# Patient Record
Sex: MALE | Race: BLACK OR AFRICAN AMERICAN | Employment: FULL TIME | URBAN - METROPOLITAN AREA
[De-identification: names, ages, dates, MRNs, and addresses within clinical notes are randomized per-mention and may not be internally consistent; named-entity substitution may affect disease eponyms.]

---

## 2018-09-13 ENCOUNTER — OFFICE VISIT (OUTPATIENT)
Dept: FAMILY MEDICINE CLINIC | Age: 38
End: 2018-09-13

## 2018-09-13 VITALS
WEIGHT: 287 LBS | HEIGHT: 71 IN | DIASTOLIC BLOOD PRESSURE: 85 MMHG | OXYGEN SATURATION: 99 % | TEMPERATURE: 98.2 F | RESPIRATION RATE: 16 BRPM | HEART RATE: 80 BPM | SYSTOLIC BLOOD PRESSURE: 137 MMHG | BODY MASS INDEX: 40.18 KG/M2

## 2018-09-13 DIAGNOSIS — Z00.00 PHYSICAL EXAM: Primary | ICD-10-CM

## 2018-09-13 DIAGNOSIS — Z28.21 REFUSED INFLUENZA VACCINE: ICD-10-CM

## 2018-09-13 DIAGNOSIS — Z11.4 ENCOUNTER FOR SCREENING FOR HIV: ICD-10-CM

## 2018-09-13 DIAGNOSIS — Z11.59 NEED FOR HEPATITIS C SCREENING TEST: ICD-10-CM

## 2018-09-13 DIAGNOSIS — Z76.89 ENCOUNTER TO ESTABLISH CARE: ICD-10-CM

## 2018-09-13 NOTE — MR AVS SNAPSHOT
[address] 
[phone] Patient: Esperanza Hayden MRN: [id number] [facility]:[birth year] Visit Information Date & Time Provider Department Dept. Phone Encounter #  
 9/13/2018  1:00 PM Verito Olvera MD [address] [phone] [id number] Follow-up Instructions Return if symptoms worsen or fail to improve. Upcoming Health Maintenance Date Due Influenza Age 5 to Adult 10/3/2018* DTaP/Tdap/Td series (1 - Tdap) 10/9/2028* *Topic was postponed. The date shown is not the original due date. Allergies as of 9/13/2018  Review Complete On: 9/13/2018 By: Jayla Meredith LPN No Known Allergies Current Immunizations  Never Reviewed No immunizations on file. Not reviewed this visit You Were Diagnosed With   
  
 Codes Comments Physical exam    -  Primary ICD-10-CM: Z00.00 ICD-9-CM: V70.9 Refused influenza vaccine     ICD-10-CM: Z28.21 ICD-9-CM: V64.06 Encounter to establish care     ICD-10-CM: Z76.89 
ICD-9-CM: V65.8 Need for hepatitis C screening test     ICD-10-CM: Z11.59 
ICD-9-CM: V73.89 Encounter for screening for HIV     ICD-10-CM: Z11.4 ICD-9-CM: V73.89 Vitals BP Pulse Temp Resp Height(growth percentile) Weight(growth percentile) 137/85 (BP 1 Location: Right arm, BP Patient Position: Sitting) 80 98.2 °F (36.8 °C) (Oral) 16 5' 11\" (1.803 m) 287 lb (130.2 kg) SpO2 BMI Smoking Status 99% 40.03 kg/m2 Never Smoker Vitals History BMI and BSA Data Body Mass Index Body Surface Area 40.03 kg/m 2 2.55 m 2 Preferred Pharmacy Pharmacy Name Phone [id number], [address] [phone] Your Updated Medication List  
  
Notice  As of 9/13/2018  1:20 PM  
 You have not been prescribed any medications. We Performed the Following CBC W/O DIFF [85796 CPT(R)] HEMOGLOBIN A1C WITH EAG [79230 CPT(R)] HEPATITIS C AB [93418 CPT(R)] HIV 1/2 AG/AB, 4TH GENERATION,W RFLX CONFIRM C9089137 CPT(R)] LIPID PANEL [05561 CPT(R)] METABOLIC PANEL, COMPREHENSIVE [32336 CPT(R)] Follow-up Instructions Return if symptoms worsen or fail to improve. Patient Instructions Well Visit, Ages 25 to 48: Care Instructions Your Care Instructions Physical exams can help you stay healthy. Your doctor has checked your overall health and may have suggested ways to take good care of yourself. He or she also may have recommended tests. At home, you can help prevent illness with healthy eating, regular exercise, and other steps. Follow-up care is a key part of your treatment and safety. Be sure to make and go to all appointments, and call your doctor if you are having problems. It's also a good idea to know your test results and keep a list of the medicines you take. How can you care for yourself at home? · Reach and stay at a healthy weight. This will lower your risk for many problems, such as obesity, diabetes, heart disease, and high blood pressure. · Get at least 30 minutes of physical activity on most days of the week. Walking is a good choice. You also may want to do other activities, such as running, swimming, cycling, or playing tennis or team sports. Discuss any changes in your exercise program with your doctor. · Do not smoke or allow others to smoke around you. If you need help quitting, talk to your doctor about stop-smoking programs and medicines. These can increase your chances of quitting for good. · Talk to your doctor about whether you have any risk factors for sexually transmitted infections (STIs). Having one sex partner (who does not have STIs and does not have sex with anyone else) is a good way to avoid these infections. · Use birth control if you do not want to have children at this time.  Talk with your doctor about the choices available and what might be best for you. · Protect your skin from too much sun. When you're outdoors from 10 a.m. to 4 p.m., stay in the shade or cover up with clothing and a hat with a wide brim. Wear sunglasses that block UV rays. Even when it's cloudy, put broad-spectrum sunscreen (SPF 30 or higher) on any exposed skin. · See a dentist one or two times a year for checkups and to have your teeth cleaned. · Wear a seat belt in the car. · Drink alcohol in moderation, if at all. That means no more than 2 drinks a day for men and 1 drink a day for women. Follow your doctor's advice about when to have certain tests. These tests can spot problems early. For everyone · Cholesterol. Have the fat (cholesterol) in your blood tested after age 21. Your doctor will tell you how often to have this done based on your age, family history, or other things that can increase your risk for heart disease. · Blood pressure. Have your blood pressure checked during a routine doctor visit. Your doctor will tell you how often to check your blood pressure based on your age, your blood pressure results, and other factors. · Vision. Talk with your doctor about how often to have a glaucoma test. 
· Diabetes. Ask your doctor whether you should have tests for diabetes. · Colon cancer. Have a test for colon cancer at age 48. You may have one of several tests. If you are younger than 48, you may need a test earlier if you have any risk factors. Risk factors include whether you already had a precancerous polyp removed from your colon or whether your parent, brother, sister, or child has had colon cancer. For women · Breast exam and mammogram. Talk to your doctor about when you should have a clinical breast exam and a mammogram. Medical experts differ on whether and how often women under 50 should have these tests. Your doctor can help you decide what is right for you. · Pap test and pelvic exam. Begin Pap tests at age 24. A Pap test is the best way to find cervical cancer. The test often is part of a pelvic exam. Ask how often to have this test. 
· Tests for sexually transmitted infections (STIs). Ask whether you should have tests for STIs. You may be at risk if you have sex with more than one person, especially if your partners do not wear condoms. For men · Tests for sexually transmitted infections (STIs). Ask whether you should have tests for STIs. You may be at risk if you have sex with more than one person, especially if you do not wear a condom. · Testicular cancer exam. Ask your doctor whether you should check your testicles regularly. · Prostate exam. Talk to your doctor about whether you should have a blood test (called a PSA test) for prostate cancer. Experts differ on whether and when men should have this test. Some experts suggest it if you are older than 39 and are -American or have a father or brother who got prostate cancer when he was younger than 72. When should you call for help? Watch closely for changes in your health, and be sure to contact your doctor if you have any problems or symptoms that concern you. Where can you learn more? Go to http://ashly-pamella.info/. Enter P072 in the search box to learn more about \"Well Visit, Ages 25 to 48: Care Instructions. \" Current as of: May 16, 2017 Content Version: 11.7 © 9796-7642 [employer]. Care instructions adapted under license by [employer] (which disclaims liability or warranty for this information). If you have questions about a medical condition or this instruction, always ask your healthcare professional. [place] [address] any warranty or liability for your use of this information. Introducing [facility]!    
 [employer] introduces [employer] patient portal. Now you can access parts of your medical record, email your doctor's office, and request medication refills online. 1. In your internet browser, go to [email or web address] 2. Click on the First Time User? Click Here link in the Sign In box. You will see the New Member Sign Up page. 3. Enter your [employer] Access Code exactly as it appears below. You will not need to use this code after youve completed the sign-up process. If you do not sign up before the expiration date, you must request a new code. · [employer] Access Code: [email or web address] Expires: 12/12/2018  1:20 PM 
 
4. Enter the last four digits of your Social Security Number (xxxx) and Date of Birth (mm/dd/yyyy) as indicated and click Submit. You will be taken to the next sign-up page. 5. Create a [employer] ID. This will be your [employer] login ID and cannot be changed, so think of one that is secure and easy to remember. 6. Create a [employer] password. You can change your password at any time. 7. Enter your Password Reset Question and Answer. This can be used at a later time if you forget your password. 8. Enter your e-mail address. You will receive e-mail notification when new information is available in [address] 9. Click Sign Up. You can now view and download portions of your medical record. 10. Click the Download Summary menu link to download a portable copy of your medical information. If you have questions, please visit the Frequently Asked Questions section of the [employer] website. Remember, [employer] is NOT to be used for urgent needs. For medical emergencies, dial 911. Now available from your iPhone and Android! Please provide this summary of care documentation to your next provider. Your primary care clinician is listed as Jenny Avila. If you have any questions after today's visit, please call [phone].

## 2018-09-13 NOTE — PATIENT INSTRUCTIONS

## 2018-09-13 NOTE — PROGRESS NOTES
Identified Patient with two Patient identifiers (Name and ). Two Patient Identifiers confirmed. Reviewed record in preparation for visit and have obtained necessary documentation. Chief Complaint Patient presents with [facility] Tdap -2018  Decline Influenza Vaccine at this time Visit Vitals  /85 (BP 1 Location: Right arm, BP Patient Position: Sitting)  Pulse 80  Temp 98.2 °F (36.8 °C) (Oral)  Resp 16  
 Ht 5' 11\" (1.803 m)  Wt 287 lb (130.2 kg)  SpO2 99%  BMI 40.03 kg/m2 1. Have you been to the ER, urgent care clinic since your last visit? Hospitalized since your last visit? No 
 
2. Have you seen or consulted any other health care providers outside of the [address] [place] [address] since your last visit? Include any pap smears or colon screening.  No

## 2018-09-13 NOTE — PROGRESS NOTES
Subjective:  
Yana Shay is an 41 y.o. male who presents for complete physical exam. 
 
The patient is new to me and to [facility] practice. Doing well. No complaints. Recently had Keloid removed from his nose by dermatologist. 
 
Diet: He recently started to work on his diet to loose weight. Eating more salads and veggies, lean chicken, sometimes fast food. Exercise: Goes to GYM 3-4 times a week Allergies - reviewed:  
No Known Allergies Medications - reviewed: No current outpatient prescriptions on file. No current facility-administered medications for this visit. Past Medical History - reviewed: 
History reviewed. No pertinent past medical history. Past Surgical History - reviewed: 
Past Surgical History:  
Procedure Laterality Date  HX ORTHOPAEDIC Left Ankle-2 PIns/2Screw Placement Family History - reviewed: 
Family History Problem Relation Age of Onset  Diabetes Mother Social History - reviewed: 
Social History Social History  Marital status: UNKNOWN Spouse name: N/A  
 Number of children: N/A  
 Years of education: N/A Occupational History  Not on file. Social History Main Topics  Smoking status: Never Smoker  Smokeless tobacco: Never Used  Alcohol use No  
 Drug use: No  
 Sexual activity: Yes  
  Partners: Female Birth control/ protection: None, Surgical  
 
Other Topics Concern  Not on file Social History Narrative  No narrative on file Immunizations - reviewed: There is no immunization history on file for this patient. Flu: Declined today Tdap: Received in July, 2018 at work Health Maintenance reviewed - 
Colonoscopy Not indicated HIV testing Done a long time ago, requested today Hepatitis C testing Never done, requested today Lung cancer screening : Not indicated Review of Systems CONSTITUTIONAL: denies fever. Denies chills. EYES: denies double vision. Has blurry vision relieved with contacts. ENT: denies sinus congestion. Denies sinus drainage CARDIOVASCULAR: denies chest pain. Denies palpitations RESPIRATORY: denies shortness of breath GI: denies abdominal pain. Denies change in stools. Denies hematochezia/melana : denies dysuria, denies urgency NEURO: denies headaches, denies dizziness MUSCULOSKELETAL: denies joint pain. SKIN: denies rash. Denies easy bruising PSYCH: denies anxiety. Denies depression Objective:  
 
Visit Vitals  /85 (BP 1 Location: Right arm, BP Patient Position: Sitting)  Pulse 80  Temp 98.2 °F (36.8 °C) (Oral)  Resp 16  
 Ht 5' 11\" (1.803 m)  Wt 287 lb (130.2 kg)  SpO2 99%  BMI 40.03 kg/m2 General appearance - alert, well appearing, and in no distress Eyes - pupils equal and reactive, extraocular eye movements intact Ears - bilateral TM's and external ear canals normal 
Nose - normal and patent, no erythema, discharge or polyps, small patch on the nose. Mouth - mucous membranes moist, pharynx normal without lesions Neck - supple, no significant adenopathy Chest - clear to auscultation, no wheezes, rales or rhonchi, symmetric air entry Heart - normal rate, regular rhythm, normal S1, S2, no murmurs, rubs, clicks or gallops Abdomen - soft, nontender, nondistended, no masses or organomegaly Neurological - alert, oriented, normal speech, no focal findings or movement disorder noted Musculoskeletal - no joint tenderness, deformity or swelling Extremities - peripheral pulses normal, no pedal edema, no clubbing or cyanosis Skin - normal coloration and turgor, no rashes, no suspicious skin lesions noted Assessment:  
40 yo male w/o significant medical history who is here for: ICD-10-CM ICD-9-CM 1.  Physical exam Z00.00 V70.9 CBC W/O DIFF  
   METABOLIC PANEL, COMPREHENSIVE  
   LIPID PANEL  
   HEMOGLOBIN A1C WITH EAG  
 2. Refused influenza vaccine Z28.21 V64.06   
3. Encounter to establish care Z76.89 V65.8 4. Need for hepatitis C screening test Z11.59 V73.89 HEPATITIS C AB  
5. Encounter for screening for HIV Z11.4 V73.89 HIV 1/2 AG/AB, 4TH GENERATION,W RFLX CONFIRM Plan:  
· Counseled re: diet, exercise, healthy lifestyle · Appropriate labs, vaccines, imaging studies, and referrals ordered as listed above Discussed the patient's BMI with him. The BMI follow up plan is as follows: recommended continue regular exercise and diet. · The patient was counseled on the dangers of tobacco use, and was advised to quit. Reviewed strategies to maximize success, including written materials. Follow-up Disposition: 
Return if symptoms worsen or fail to improve. I have discussed the diagnosis with the patient and the intended plan as seen in the above orders. The patient has received an after-visit summary and questions were answered concerning future plans. I have discussed medication side effects and warnings with the patient as well. Informed pt to return to the office if new symptoms arise.  
 
 
Linh Jackson MD 
Family Medicine Resident, PGY-3

## 2018-09-14 LAB
ALBUMIN SERPL-MCNC: 4.3 G/DL (ref 3.5–5.5)
ALBUMIN/GLOB SERPL: 1.5 {RATIO} (ref 1.2–2.2)
ALP SERPL-CCNC: 48 IU/L (ref 39–117)
ALT SERPL-CCNC: 31 IU/L (ref 0–44)
AST SERPL-CCNC: 45 IU/L (ref 0–40)
BILIRUB SERPL-MCNC: 0.3 MG/DL (ref 0–1.2)
BUN SERPL-MCNC: 12 MG/DL (ref 6–20)
BUN/CREAT SERPL: 10 (ref 9–20)
CALCIUM SERPL-MCNC: 9.7 MG/DL (ref 8.7–10.2)
CHLORIDE SERPL-SCNC: 101 MMOL/L (ref 96–106)
CHOLEST SERPL-MCNC: 176 MG/DL (ref 100–199)
CO2 SERPL-SCNC: 22 MMOL/L (ref 20–29)
CREAT SERPL-MCNC: 1.18 MG/DL (ref 0.76–1.27)
ERYTHROCYTE [DISTWIDTH] IN BLOOD BY AUTOMATED COUNT: 15.6 % (ref 12.3–15.4)
EST. AVERAGE GLUCOSE BLD GHB EST-MCNC: 148 MG/DL
GLOBULIN SER CALC-MCNC: 2.9 G/DL (ref 1.5–4.5)
GLUCOSE SERPL-MCNC: 115 MG/DL (ref 65–99)
HBA1C MFR BLD: 6.8 % (ref 4.8–5.6)
HCT VFR BLD AUTO: 43.8 % (ref 37.5–51)
HCV AB S/CO SERPL IA: <0.1 S/CO RATIO (ref 0–0.9)
HDLC SERPL-MCNC: 30 MG/DL
HGB BLD-MCNC: 14.1 G/DL (ref 13–17.7)
HIV 1+2 AB+HIV1 P24 AG SERPL QL IA: NON REACTIVE
INTERPRETATION, 910389: NORMAL
LDLC SERPL CALC-MCNC: 92 MG/DL (ref 0–99)
Lab: NORMAL
MCH RBC QN AUTO: 23.7 PG (ref 26.6–33)
MCHC RBC AUTO-ENTMCNC: 32.2 G/DL (ref 31.5–35.7)
MCV RBC AUTO: 74 FL (ref 79–97)
PLATELET # BLD AUTO: 295 X10E3/UL (ref 150–379)
POTASSIUM SERPL-SCNC: 4.4 MMOL/L (ref 3.5–5.2)
PROT SERPL-MCNC: 7.2 G/DL (ref 6–8.5)
RBC # BLD AUTO: 5.95 X10E6/UL (ref 4.14–5.8)
SODIUM SERPL-SCNC: 140 MMOL/L (ref 134–144)
TRIGL SERPL-MCNC: 271 MG/DL (ref 0–149)
VLDLC SERPL CALC-MCNC: 54 MG/DL (ref 5–40)
WBC # BLD AUTO: 7.2 X10E3/UL (ref 3.4–10.8)

## 2018-09-14 NOTE — PROGRESS NOTES
CBC WNL, Hg is 14.1, CMP with slight elevation of AST to 45, lipid panel with , , HDL 30 ,LDL 92. Hg A1C is 6.8 -c/w diabetes. HIV is NR, Hep C is negative. Will call and discuss with the patient.

## 2018-09-17 ENCOUNTER — TELEPHONE (OUTPATIENT)
Dept: FAMILY MEDICINE CLINIC | Age: 38
End: 2018-09-17

## 2018-09-17 DIAGNOSIS — R94.5 ABNORMAL RESULTS OF LIVER FUNCTION STUDIES: Primary | ICD-10-CM

## 2018-09-17 NOTE — TELEPHONE ENCOUNTER
I called the patient at [phone] to discuss the test results. The patient was identified by 2 identifiers. Discussed results. Discussed recently diagnosed DM with HgA1C of 6.8, discussed treatment options. The patient would like to work on diet and exercise and recheck the labs in 3 months, would like to hold on medications. Will recheck CMP , lipid, urine microalbumin and HgA1C in 3 months. TH ept will scheduled lab appointment.      1:37 PM  9/17/2018  Estella Brown MD

## 2018-09-30 ENCOUNTER — ED HISTORICAL/CONVERTED ENCOUNTER (OUTPATIENT)
Dept: OTHER | Age: 38
End: 2018-09-30

## 2019-04-12 ENCOUNTER — OFFICE VISIT (OUTPATIENT)
Dept: FAMILY MEDICINE CLINIC | Age: 39
End: 2019-04-12

## 2019-04-12 VITALS
DIASTOLIC BLOOD PRESSURE: 78 MMHG | HEART RATE: 88 BPM | WEIGHT: 290.6 LBS | HEIGHT: 71 IN | BODY MASS INDEX: 40.68 KG/M2 | RESPIRATION RATE: 18 BRPM | OXYGEN SATURATION: 98 % | TEMPERATURE: 98.1 F | SYSTOLIC BLOOD PRESSURE: 150 MMHG

## 2019-04-12 DIAGNOSIS — Z11.3 SCREENING EXAMINATION FOR STD (SEXUALLY TRANSMITTED DISEASE): ICD-10-CM

## 2019-04-12 DIAGNOSIS — R03.0 ELEVATED BLOOD PRESSURE READING: Primary | ICD-10-CM

## 2019-04-12 NOTE — LETTER
NOTIFICATION RETURN TO WORK / SCHOOL 
 
4/12/2019 4:09 PM 
 
Mr. Richy Mclean [address] [id number] To Whom It May Concern: 
 
Richy Mclean is currently under the care of [facility]. He was seen in the office today, Friday, April 12, 2019 If there are questions or concerns please have the patient contact our office.  
 
 
 
Sincerely, 
 
 
Skyler Ann MD

## 2019-04-12 NOTE — PROGRESS NOTES
Vega Vega is a 46 y.o. male who presents for   Elevated BP: 156/100 at daughter's PCP this morning. Recently switched to night shift and not sleeping as well. No prior h/o HTN. No family h/o HTN. Requesting STD screen. Had negative HIV 9/2018. Would like syphilis, gonorrhea, and chlamydia screening. He is asymptomatic. PMHx:  History reviewed. No pertinent past medical history. Meds: none      Allergies:   No Known Allergies    Smoker:  Social History     Tobacco Use   Smoking Status Never Smoker   Smokeless Tobacco Never Used       ETOH:   Social History     Substance and Sexual Activity   Alcohol Use No       FH:   Family History   Problem Relation Age of Onset    Diabetes Mother        ROS:  General/Constitutional:   No headache, fever, fatigue, weight loss or weight gain       Eyes:   No redness, pruritis, pain, visual changes, swelling, or discharge      Ears:    No pain, loss or changes in hearing     Neck:   No swelling, masses, stiffness, pain, or limited movement     Cardiac:    No chest pain      Respiratory:   No cough or shortness of breath     GI:   No nausea/vomiting, diarrhea, abdominal pain, bloody or dark stools       :   No dysuria or  hematuria    Neurological:   No loss of consciousness, dizziness, seizures, dysarthria, cognitive changes, memory changes,  problems with balance, or unilateral weakness     Skin: No rash     Physical Exam:  Visit Vitals  /78 (BP 1 Location: Left arm, BP Patient Position: Sitting)   Pulse 88   Temp 98.1 °F (36.7 °C) (Oral)   Resp 18   Ht 5' 11\" (1.803 m)   Wt 290 lb 9.6 oz (131.8 kg)   SpO2 98%   BMI 40.53 kg/m²     GEN: No apparent distress. Alert and oriented and responds to all questions appropriately.   EYES:  Conjunctiva clear;   LUNGS: Respirations unlabored; clear to auscultation bilaterally  CARDIOVASCULAR: Regular, rate, and rhythm without murmurs, gallops or rubs   ABDOMEN: Soft; nontender; nondistended; normoactive bowel sounds; no masses or organomegaly  NEUROLOGIC:  No focal neurologic deficits. EXT: Well perfused. No edema. SKIN: No obvious rashes. Assessment:    ICD-10-CM ICD-9-CM    1. Elevated blood pressure reading R03.0 796.2 CBC W/O DIFF      HEMOGLOBIN A1C WITH EAG      TSH 3RD GENERATION      LIPID PANEL   2. Screening examination for STD (sexually transmitted disease) Z11.3 V74.5 RPR      CHLAMYDIA/GC PCR       Plan:  Elevated BP: Labs today. He will keep a home BP log. RTC in 2 weeks. Discussed diet, exercise, and weight loss. STD screening: labs as above. RTC: 2 weeks.

## 2019-04-12 NOTE — PROGRESS NOTES
Identified Patient with two Patient identifiers (Name and ). Two Patient Identifiers confirmed. Reviewed record in preparation for visit and have obtained necessary documentation. Chief Complaint   Patient presents with    Blood Pressure Check     Patient states BP Check at Daughter's Physician's Office of 156/107 this morning due to headache. No personal or family history Hypertension        Visit Vitals  /78 (BP 1 Location: Left arm, BP Patient Position: Sitting)   Pulse 88   Temp 98.1 °F (36.7 °C) (Oral)   Resp 18   Ht 5' 11\" (1.803 m)   Wt 290 lb 9.6 oz (131.8 kg)   SpO2 98%   BMI 40.53 kg/m²       1. Have you been to the ER, urgent care clinic since your last visit? Hospitalized since your last visit? No    2. Have you seen or consulted any other health care providers outside of the [address] [place] [address] since your last visit? Include any pap smears or colon screening.  No

## 2019-04-13 LAB
CHOLEST SERPL-MCNC: 174 MG/DL (ref 100–199)
ERYTHROCYTE [DISTWIDTH] IN BLOOD BY AUTOMATED COUNT: 15.6 % (ref 12.3–15.4)
EST. AVERAGE GLUCOSE BLD GHB EST-MCNC: 143 MG/DL
HBA1C MFR BLD: 6.6 % (ref 4.8–5.6)
HCT VFR BLD AUTO: 46.2 % (ref 37.5–51)
HDLC SERPL-MCNC: 27 MG/DL
HGB BLD-MCNC: 14.4 G/DL (ref 13–17.7)
INTERPRETATION, 910389: NORMAL
LDLC SERPL CALC-MCNC: 76 MG/DL (ref 0–99)
Lab: NORMAL
MCH RBC QN AUTO: 23.8 PG (ref 26.6–33)
MCHC RBC AUTO-ENTMCNC: 31.2 G/DL (ref 31.5–35.7)
MCV RBC AUTO: 76 FL (ref 79–97)
PLATELET # BLD AUTO: 251 X10E3/UL (ref 150–379)
RBC # BLD AUTO: 6.05 X10E6/UL (ref 4.14–5.8)
RPR SER QL: NON REACTIVE
TRIGL SERPL-MCNC: 354 MG/DL (ref 0–149)
TSH SERPL DL<=0.005 MIU/L-ACNC: 2.78 UIU/ML (ref 0.45–4.5)
VLDLC SERPL CALC-MCNC: 71 MG/DL (ref 5–40)
WBC # BLD AUTO: 6.2 X10E3/UL (ref 3.4–10.8)

## 2019-04-16 LAB
C TRACH RRNA SPEC QL NAA+PROBE: NEGATIVE
N GONORRHOEA RRNA SPEC QL NAA+PROBE: NEGATIVE

## 2019-04-30 ENCOUNTER — TELEPHONE (OUTPATIENT)
Dept: FAMILY MEDICINE CLINIC | Age: 39
End: 2019-04-30

## 2019-04-30 NOTE — TELEPHONE ENCOUNTER
Call attempted phon e temporarily out of service      ----- Message from Marianne Mejias MD sent at 4/29/2019  1:29 PM EDT -----  Please make an appointment for pt to review labs. Thanks.

## 2019-05-01 NOTE — TELEPHONE ENCOUNTER
Spoke with pt wife at   [phone] and she will have him to call, please schedule appt with Dr. Mali Del Toro

## 2019-05-16 ENCOUNTER — ED HISTORICAL/CONVERTED ENCOUNTER (OUTPATIENT)
Dept: OTHER | Age: 39
End: 2019-05-16

## 2021-06-23 ENCOUNTER — TELEPHONE (OUTPATIENT)
Dept: FAMILY MEDICINE CLINIC | Age: 41
End: 2021-06-23

## 2021-06-23 NOTE — TELEPHONE ENCOUNTER
I called patient this morning and it looks like he wanted to schedule his complete physical. I scheduled him for next week and he is aware about date and time.

## 2021-06-23 NOTE — TELEPHONE ENCOUNTER
----- Message from Tutu Rich sent at 6/22/2021  3:22 PM EDT -----  Regarding: Dr. Maureen Viveros: [phone]  Appointment not available    Caller's first and last name and relationship to patient (if not the patient): N/A      Best contact number:[phone]      Preferred date and time: First available, anytime      Scheduled appointment date and time: N/A      Reason for appointment: Routine Care. Details to clarify the request: Patient would like to come into the office.         Tutu Rich

## 2024-12-28 ENCOUNTER — HOSPITAL ENCOUNTER (EMERGENCY)
Facility: HOSPITAL | Age: 44
Discharge: HOME | End: 2024-12-28
Attending: STUDENT IN AN ORGANIZED HEALTH CARE EDUCATION/TRAINING PROGRAM

## 2024-12-28 VITALS
BODY MASS INDEX: 34.58 KG/M2 | HEART RATE: 106 BPM | TEMPERATURE: 102.1 F | DIASTOLIC BLOOD PRESSURE: 68 MMHG | SYSTOLIC BLOOD PRESSURE: 133 MMHG | HEIGHT: 71 IN | WEIGHT: 247 LBS | RESPIRATION RATE: 20 BRPM | OXYGEN SATURATION: 96 %

## 2024-12-28 DIAGNOSIS — J10.1 INFLUENZA A: Primary | ICD-10-CM

## 2024-12-28 LAB
FLUAV RNA SPEC QL NAA+PROBE: DETECTED
FLUBV RNA SPEC QL NAA+PROBE: NOT DETECTED
SARS-COV-2 RNA RESP QL NAA+PROBE: NOT DETECTED

## 2024-12-28 PROCEDURE — 87636 SARSCOV2 & INF A&B AMP PRB: CPT

## 2024-12-28 PROCEDURE — 99283 EMERGENCY DEPT VISIT LOW MDM: CPT

## 2024-12-28 PROCEDURE — 6370000000 HC RX 637 (ALT 250 FOR IP): Performed by: STUDENT IN AN ORGANIZED HEALTH CARE EDUCATION/TRAINING PROGRAM

## 2024-12-28 RX ORDER — OSELTAMIVIR PHOSPHATE 75 MG/1
75 CAPSULE ORAL 2 TIMES DAILY
Qty: 10 CAPSULE | Refills: 0 | Status: SHIPPED | OUTPATIENT
Start: 2024-12-28 | End: 2025-01-02

## 2024-12-28 RX ORDER — IBUPROFEN 600 MG/1
600 TABLET, FILM COATED ORAL
Status: COMPLETED | OUTPATIENT
Start: 2024-12-28 | End: 2024-12-28

## 2024-12-28 RX ADMIN — IBUPROFEN 600 MG: 600 TABLET, FILM COATED ORAL at 05:59

## 2024-12-28 ASSESSMENT — PAIN SCALES - GENERAL
PAINLEVEL_OUTOF10: 7
PAINLEVEL_OUTOF10: 7

## 2024-12-28 ASSESSMENT — PAIN - FUNCTIONAL ASSESSMENT: PAIN_FUNCTIONAL_ASSESSMENT: 0-10

## 2024-12-28 ASSESSMENT — LIFESTYLE VARIABLES
HOW OFTEN DO YOU HAVE A DRINK CONTAINING ALCOHOL: MONTHLY OR LESS
HOW MANY STANDARD DRINKS CONTAINING ALCOHOL DO YOU HAVE ON A TYPICAL DAY: 1 OR 2

## 2024-12-28 ASSESSMENT — PAIN DESCRIPTION - PAIN TYPE: TYPE: ACUTE PAIN

## 2024-12-28 ASSESSMENT — PAIN DESCRIPTION - LOCATION: LOCATION: HEAD

## 2024-12-28 NOTE — DISCHARGE INSTRUCTIONS
PRIMARY CARE in [facility]:  [address] [place] [address].  [phone]  Radha Mai MD Family Medicine  Jose De Jesus Lovett MD Family Medicine  Nicho Wiley MD Family Medicine    [facility]: [address] [place] [address]. [phone]   Marcos Marroquin MD Family Medicine  Anival Gold, WARNER Family Medicine  Diana Curry, WARNER Family Medicine    [facility]: [address], [address], [place] [address]. [phone]  Iveth Byers MD Family Medicine  Monisha Celis MD Family Medicine  Nabor Chance, WARNER Family Medicine  Moni Cruz, WARNER Family Medicine    [facility]: [address] [address]. [phone]  Charissa Marroquin MD Internal Medicine  Sarah Arriaza MD Internal Medicine  Eladia Lamb MD Internal Medicine  Debbie Costa, PA Family Medicine    [facility]: [address] [address]. [phone]  Lesley Linton MD Family Medicine  Karen Lopez MD Family Medicine  Zi Lezama, DO Infectious Disease  Phan Williamson MD Family Medicine    [facility]: [address], [address], [place] [address]. [phone]  Madelaine Salcedo,  Family Medicine  Hortensia Chance NP Internal Medicine  [place], WARNER Internal Medicine    [facility]: [address] [place], [place] [address]. [phone]  Kathia Vega MD Internal Medicine  Demond Arriaga MD Internal Medicine    [facility]: [address] [place] [address]. [phone]  Sandra Barnhart MD Family Medicine  Anju Sanchez MD Family Medicine  Chung Meadows MD Family Medicine  Karime Miranda, WARNER Family Medicine  Ashutosh Chance, ARPN, CNP Family Medicine    [facility]: [address], [place] [address]. [phone]  Karla Avila MD Internal Medicine  Vivi Webber MD Internal Medicine  Pedro Contreras MD Internal Medicine  Lester Hope MD Internal Medicine  Jayne Prasad MD Internal Medicine  Cori Alford MD Internal Medicine  Samaria Frederick, NP Internal Medicine    Primary Care [facility]: [address], [address], [place] [address]. [phone]  Meghana Ochoa MD Family Medicine  Maggie Salazar MD Family Medicine  Nuno León MD Family Medicine  Nemo Fairchild, PA Family Medicine  Isha Agustin, NP Family Medicine  Yamileth Nevarez, WARNER Family Medicine  Ray Reagan, NP Family Medicine    [facility]: [facility], [address], [place] [address]. [phone]   Livier Salazar MD Family Medicine   Naty Montes Family Medicine   Ceci Marino MD Family Medicine   Trina Osman, WARNER Family Medicine    Primary [facility]: [address] [place] [address]. [phone]  Nakul Squires MD Family Medicine  Lisy Pretty MD Family Medicine  Christina Rinaldi MD Family Medicine  Seth Sue MD Family Medicine  Vanda Alvarado, DO Family Medicine  Luis Ellis MD Family Medicine  Claudia Sun MD Family Medicine  Mary Pisano, DO Family Medicine    [facility]: [address] [place] [address]. [phone]  Alana Blake ZOILA Family Medicine  Marimar Arevalo, CHARLIE Family Medicine    [facility]: [address], [place] [address]. [phone]  MD Debbie Snow, PA Family Medicine     [facility]: [address] [place] [address]. [phone]  MD Guy Mccoy MD    [facility]: [address] [place] [address]  [address]. [phone]  Teddy Hwang,

## 2024-12-28 NOTE — ED PROVIDER NOTES
[facility] EMERGENCY DEPT  EMERGENCY DEPARTMENT HISTORY AND PHYSICAL EXAM      Date: 12/28/2024  Patient Name: Marianela Yeager    History of Presenting Illness     Chief Complaint   Patient presents with    Generalized Body Aches    Congestion    Headache       History Provided By: Patient    HPI: Marianela Yeager, 44 y.o. male presents to the ED with CC of male presents to Emergency Department for aches, chills, headache, nasal congestion, dry cough, states symptoms started yesterday evening.  Multiple coworkers are ill with the flu.  Patient denies any trouble breathing, denies any abdominal pain nausea vomiting has not taken any medication at home for.       Patient denies SOB, chest pain, or any neurological symptoms.  There are no other complaints, changes, or physical findings at this time.     Past History     Past Medical History:  Past Medical History:   Diagnosis Date    Prediabetes        Allergies:  No Known Allergies    Physical Exam     Vitals:    12/28/24 0600   BP: 133/68   Pulse:    Resp:    Temp:    SpO2: 96%     CONSTITUTIONAL: Alert, in no distress. Appears stated age.  HEENT:  Normocephalic, atraumatic, EOM intact.    Neck:  Supple. No meningismus  RESP: Normal with no work of breathing, speaking in full sentences.  CV: Well perfused.   NEURO: Alert with normal mentation, moving extremities spontaneously  PSYCH: Normal mood, normal affect    Medical Decision Making   Patient presents for flu-like symptoms with normal oxygen saturation, benign physical exam and mild URI symptoms or exposure. Influenza testing was considered and was conducted. The patient was given supportive care recommendations and agrees with the plan to be discharged home. Tamiflu was prescribed.  They were provided instructions to return for difficulty breathing, chest pain, altered mentation, or any other new or worsening symptoms.    ED Course:   Initial assessment performed. The patients presenting problems have been discussed, and  they are in agreement with the care plan formulated and outlined with them.  I have encouraged them to ask questions as they arise throughout their visit.    ED Course as of 12/28/24 0741   Sat Dec 28, 2024   0645 Rapid Influenza A By PCR(!): DETECTED [PZ]      ED Course User Index  [PZ] Gideon Mendez MD       Social Determinants affecting Diagnosis/Treatment: None    Critical Care Time:  0 Minutes.    PLAN:  1.      Medication List        START taking these medications      oseltamivir 75 MG capsule  Commonly known as: TAMIFLU  Take 1 capsule by mouth 2 times daily for 5 days               Where to Get Your Medications        These medications were sent to [id number] [place], [place] [facility] [address] [address] [phone] - F [phone]  [address] [place] [id number]      Phone: [phone]   oseltamivir 75 MG capsule       2. @[facility]@  3. Testing results: Patient should utilize [employer] to access results.   4. Take Tylenol or Ibuprofen as needed  5. Drink plenty of fluids  6. Return to ED if worse especially if any shortness of breath, chest pain or altered mentation.    FINAL IMPRESSION     1. Influenza A          DISPOSITION/PLAN   DISPOSITION Decision To Discharge 12/28/2024 06:46:02 AM   DISPOSITION CONDITION Stable        Discharge Note: The patient is stable for discharge home. The signs, symptoms, diagnosis, and discharge instructions have been discussed, understanding conveyed, and agreed upon. The patient is to follow up as recommended or return to ER should their symptoms worsen.      PATIENT REFERRED TO:  No follow-up provider specified.     DISCHARGE MEDICATIONS:     Medication List        START taking these medications      oseltamivir 75 MG capsule  Commonly known as: TAMIFLU  Take 1 capsule by mouth 2 times daily for 5 days               Where to Get Your Medications        These medications were sent to [facility]  [email or web address] [facility] [address] [facility]

## 2024-12-28 NOTE — ED TRIAGE NOTES
C/o generalized body aches, headaches, congestion, chills, and runny nose starting yesterday. Denies fevers at home. Reports coworker is sick that he has been around.